# Patient Record
Sex: MALE | Race: BLACK OR AFRICAN AMERICAN | NOT HISPANIC OR LATINO | URBAN - METROPOLITAN AREA
[De-identification: names, ages, dates, MRNs, and addresses within clinical notes are randomized per-mention and may not be internally consistent; named-entity substitution may affect disease eponyms.]

---

## 2018-08-03 ENCOUNTER — INPATIENT (INPATIENT)
Facility: HOSPITAL | Age: 53
LOS: 1 days | Discharge: HOME | DRG: 872 | End: 2018-08-05
Attending: STUDENT IN AN ORGANIZED HEALTH CARE EDUCATION/TRAINING PROGRAM | Admitting: STUDENT IN AN ORGANIZED HEALTH CARE EDUCATION/TRAINING PROGRAM
Payer: COMMERCIAL

## 2018-08-03 VITALS
HEART RATE: 102 BPM | SYSTOLIC BLOOD PRESSURE: 104 MMHG | TEMPERATURE: 98 F | RESPIRATION RATE: 18 BRPM | OXYGEN SATURATION: 96 % | DIASTOLIC BLOOD PRESSURE: 72 MMHG

## 2018-08-03 LAB
ALBUMIN SERPL ELPH-MCNC: 4.2 G/DL — SIGNIFICANT CHANGE UP (ref 3.4–5)
ALP SERPL-CCNC: 76 U/L — SIGNIFICANT CHANGE UP (ref 40–120)
ALT FLD-CCNC: 30 U/L — SIGNIFICANT CHANGE UP (ref 12–42)
ANION GAP SERPL CALC-SCNC: 12 MMOL/L — SIGNIFICANT CHANGE UP (ref 9–16)
AST SERPL-CCNC: 62 U/L — HIGH (ref 15–37)
BASOPHILS NFR BLD AUTO: 0.7 % — SIGNIFICANT CHANGE UP (ref 0–2)
BILIRUB SERPL-MCNC: 2.2 MG/DL — HIGH (ref 0.2–1.2)
BUN SERPL-MCNC: 11 MG/DL — SIGNIFICANT CHANGE UP (ref 7–23)
CALCIUM SERPL-MCNC: 9 MG/DL — SIGNIFICANT CHANGE UP (ref 8.5–10.5)
CHLORIDE SERPL-SCNC: 104 MMOL/L — SIGNIFICANT CHANGE UP (ref 96–108)
CO2 SERPL-SCNC: 23 MMOL/L — SIGNIFICANT CHANGE UP (ref 22–31)
CREAT SERPL-MCNC: 0.98 MG/DL — SIGNIFICANT CHANGE UP (ref 0.5–1.3)
EOSINOPHIL NFR BLD AUTO: 0.1 % — SIGNIFICANT CHANGE UP (ref 0–6)
GLUCOSE SERPL-MCNC: 87 MG/DL — SIGNIFICANT CHANGE UP (ref 70–99)
HCT VFR BLD CALC: 40.3 % — SIGNIFICANT CHANGE UP (ref 39–50)
HGB BLD-MCNC: 13.5 G/DL — SIGNIFICANT CHANGE UP (ref 13–17)
IMM GRANULOCYTES NFR BLD AUTO: 0.3 % — SIGNIFICANT CHANGE UP (ref 0–1.5)
LACTATE SERPL-SCNC: 1.7 MMOL/L — SIGNIFICANT CHANGE UP (ref 0.4–2)
LACTATE SERPL-SCNC: 2.7 MMOL/L — HIGH (ref 0.4–2)
LYMPHOCYTES # BLD AUTO: 11 % — LOW (ref 13–44)
MCHC RBC-ENTMCNC: 30.5 PG — SIGNIFICANT CHANGE UP (ref 27–34)
MCHC RBC-ENTMCNC: 33.5 G/DL — SIGNIFICANT CHANGE UP (ref 32–36)
MCV RBC AUTO: 91.2 FL — SIGNIFICANT CHANGE UP (ref 80–100)
MONOCYTES NFR BLD AUTO: 10 % — SIGNIFICANT CHANGE UP (ref 2–14)
NEUTROPHILS NFR BLD AUTO: 77.9 % — HIGH (ref 43–77)
PLATELET # BLD AUTO: 218 K/UL — SIGNIFICANT CHANGE UP (ref 150–400)
POTASSIUM SERPL-MCNC: 5.1 MMOL/L — SIGNIFICANT CHANGE UP (ref 3.5–5.3)
POTASSIUM SERPL-SCNC: 5.1 MMOL/L — SIGNIFICANT CHANGE UP (ref 3.5–5.3)
PROT SERPL-MCNC: 8.8 G/DL — HIGH (ref 6.4–8.2)
RBC # BLD: 4.42 M/UL — SIGNIFICANT CHANGE UP (ref 4.2–5.8)
RBC # FLD: 15.5 % — SIGNIFICANT CHANGE UP (ref 10.3–16.9)
SODIUM SERPL-SCNC: 139 MMOL/L — SIGNIFICANT CHANGE UP (ref 132–145)
WBC # BLD: 11.9 K/UL — HIGH (ref 3.8–10.5)
WBC # FLD AUTO: 11.9 K/UL — HIGH (ref 3.8–10.5)

## 2018-08-03 PROCEDURE — 99284 EMERGENCY DEPT VISIT MOD MDM: CPT | Mod: 25

## 2018-08-03 PROCEDURE — 99223 1ST HOSP IP/OBS HIGH 75: CPT | Mod: GC

## 2018-08-03 PROCEDURE — 93010 ELECTROCARDIOGRAM REPORT: CPT

## 2018-08-03 PROCEDURE — 73660 X-RAY EXAM OF TOE(S): CPT | Mod: 26,RT

## 2018-08-03 RX ORDER — VANCOMYCIN HCL 1 G
VIAL (EA) INTRAVENOUS
Qty: 0 | Refills: 0 | Status: DISCONTINUED | OUTPATIENT
Start: 2018-08-03 | End: 2018-08-03

## 2018-08-03 RX ORDER — VANCOMYCIN HCL 1 G
1500 VIAL (EA) INTRAVENOUS ONCE
Qty: 0 | Refills: 0 | Status: COMPLETED | OUTPATIENT
Start: 2018-08-03 | End: 2018-08-03

## 2018-08-03 RX ORDER — KETOROLAC TROMETHAMINE 30 MG/ML
30 SYRINGE (ML) INJECTION ONCE
Qty: 0 | Refills: 0 | Status: DISCONTINUED | OUTPATIENT
Start: 2018-08-03 | End: 2018-08-03

## 2018-08-03 RX ORDER — DIPHENHYDRAMINE HCL 50 MG
50 CAPSULE ORAL ONCE
Qty: 0 | Refills: 0 | Status: COMPLETED | OUTPATIENT
Start: 2018-08-03 | End: 2018-08-03

## 2018-08-03 RX ORDER — SODIUM CHLORIDE 9 MG/ML
2000 INJECTION INTRAMUSCULAR; INTRAVENOUS; SUBCUTANEOUS ONCE
Qty: 0 | Refills: 0 | Status: COMPLETED | OUTPATIENT
Start: 2018-08-03 | End: 2018-08-03

## 2018-08-03 RX ADMIN — Medication 300 MILLIGRAM(S): at 17:45

## 2018-08-03 RX ADMIN — Medication 30 MILLIGRAM(S): at 18:44

## 2018-08-03 RX ADMIN — Medication 50 MILLIGRAM(S): at 20:14

## 2018-08-03 RX ADMIN — Medication 30 MILLIGRAM(S): at 17:45

## 2018-08-03 RX ADMIN — SODIUM CHLORIDE 1000 MILLILITER(S): 9 INJECTION INTRAMUSCULAR; INTRAVENOUS; SUBCUTANEOUS at 17:45

## 2018-08-03 RX ADMIN — SODIUM CHLORIDE 2000 MILLILITER(S): 9 INJECTION INTRAMUSCULAR; INTRAVENOUS; SUBCUTANEOUS at 18:44

## 2018-08-03 RX ADMIN — Medication 125 MILLIGRAM(S): at 20:14

## 2018-08-03 RX ADMIN — Medication 1500 MILLIGRAM(S): at 18:44

## 2018-08-03 NOTE — H&P ADULT - PROBLEM SELECTOR PLAN 3
Patient reports drinking 18-24 beers on the weekends. Denies history of withdrawal.  -watch for s/sx of withdrawal, CIWA currently 0 Elevated bilirubin to 2.2. May be 2/2 sepsis. NTTP in RUQ. Mildly elevated AST, may also be 2/2 Sepsis vs underlying liver pathology in setting of alcohol abuse.  -f/u CMP and direct bili in AM
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
## 2018-08-03 NOTE — H&P ADULT - NSHPLABSRESULTS_GEN_ALL_CORE
LABS:                        13.5   11.9  )-----------( 218      ( 03 Aug 2018 17:39 )             40.3     08-03    139  |  104  |  11  ----------------------------<  87  5.1   |  23  |  0.98    Ca    9.0      03 Aug 2018 17:39    TPro  8.8<H>  /  Alb  4.2  /  TBili  2.2<H>  /  DBili  x   /  AST  62<H>  /  ALT  30  /  AlkPhos  76  08-03    RADIOLOGY & ADDITIONAL TESTS:

## 2018-08-03 NOTE — H&P ADULT - PROBLEM SELECTOR PLAN 5
IMPROVE score of 0, no indication for pharmacological ppx 1) PCP Contacted on Admission: (Y/N) --> Name & Phone #:  2) Date of Contact with PCP:  3) PCP Contacted at Discharge: (Y/N)  4) Summary of Handoff Given to PCP:   5) Post-Discharge Appointment Date and Location:

## 2018-08-03 NOTE — ED PROVIDER NOTE - MEDICAL DECISION MAKING DETAILS
cellulitis to R great toe MTP + drainage/streaking/tendon tenderness. Will order sepsis workup w/ lactate and blood cultures. Will get xray and give IVF + Vanc. Will admit pt
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
## 2018-08-03 NOTE — ED ADULT TRIAGE NOTE - CHIEF COMPLAINT QUOTE
pt. reports being treated about 5 days ago for a wound to his right foot. Since then he has had pain, pt. noticed over the last 2-3 days he noticed more pain and swelling to the big toe. Purulent drainage from the wound noted

## 2018-08-03 NOTE — ED PROVIDER NOTE - ATTENDING CONTRIBUTION TO CARE
54 yo male with rapidly progressing R foot infection.  Centrally located on the 1st MTP without surrounding erythema, exquisite tenderness, drainage, mild lymphatic spread and pain with passive and active extension of the first digit.  Mild wbc and lactate elevation.  Covered with vancomycin, given 2L NS bolus.  Denies fever, no tachycardia or other vital sign derangements.  D/w Dr. Espino from podiatry and will admit to medicine service.

## 2018-08-03 NOTE — ED ADULT NURSE NOTE - NSIMPLEMENTINTERV_GEN_ALL_ED
Implemented All Fall Risk Interventions:  [place] to call system. Call bell, personal items and telephone within reach. Instruct patient to call for assistance. Room bathroom lighting operational. Non-slip footwear when patient is off stretcher. Physically safe environment: no spills, clutter or unnecessary equipment. Stretcher in lowest position, wheels locked, appropriate side rails in place. Provide visual cue, wrist band, yellow gown, etc. Monitor gait and stability. Monitor for mental status changes and reorient to person, place, and time. Review medications for side effects contributing to fall risk. Reinforce activity limits and safety measures with patient and family.

## 2018-08-03 NOTE — ED ADULT NURSE REASSESSMENT NOTE - NS ED NURSE REASSESS COMMENT FT1
pt resting in bed. no signs of acute distress noted. no sob or difficulty breathing noted. per day shift report, pt had recent reaction to vanco. pt c/o mild itching denies any swelling in throat. no swelling in tongue. no hives. no angioedema noted.
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
## 2018-08-03 NOTE — H&P ADULT - NSHPPHYSICALEXAM_GEN_ALL_CORE
Vital Signs Last 12 Hrs  T(F): 98.8 (08-03-18 @ 23:23), Max: 98.9 (08-03-18 @ 22:01)  HR: 76 (08-03-18 @ 23:23) (75 - 102)  BP: 124/71 (08-03-18 @ 23:23) (104/72 - 133/90)  BP(mean): --  RR: 18 (08-03-18 @ 23:23) (18 - 18)  SpO2: 97% (08-03-18 @ 23:23) (95% - 99%)      PHYSICAL EXAM:  Constitutional: NAD, comfortable in bed.  HEENT: NC/AT, PERRLA, EOMI, no conjunctival pallor or scleral icterus, MMM  Neck: Supple, no JVD  Respiratory: Normal rate, rhythm, depth, effort. CTAB. No w/r/r.   Cardiovascular: RRR, normal S1 and S2, no m/r/g.   Gastrointestinal: +BS, soft NTND, no guarding or rebound tenderness, no palpable masses   Extremities: wwp; no cyanosis or clubbing. RLE first digit with decreased extension and flexion 2/2 pain. No crepitus but TTP on plantar and extensor surfaces of foot.   Vascular: Pulses equal and strong throughout.   Neurological: AAOx3, no CN deficits, strength and sensation intact throughout.   Skin: No gross skin abnormalities or rashes Vital Signs Last 12 Hrs  T(F): 98.8 (08-03-18 @ 23:23), Max: 98.9 (08-03-18 @ 22:01)  HR: 76 (08-03-18 @ 23:23) (75 - 102)  BP: 124/71 (08-03-18 @ 23:23) (104/72 - 133/90)  BP(mean): --  RR: 18 (08-03-18 @ 23:23) (18 - 18)  SpO2: 97% (08-03-18 @ 23:23) (95% - 99%)      PHYSICAL EXAM:  Constitutional: NAD, comfortable in bed.  HEENT: NC/AT, PERRLA, EOMI, no conjunctival pallor or scleral icterus, MMM  Neck: Supple, no JVD  Respiratory: Normal rate, rhythm, depth, effort. CTAB. No w/r/r.   Cardiovascular: RRR, normal S1 and S2, no m/r/g.   Gastrointestinal: +BS, soft NTND, no guarding or rebound tenderness, no palpable masses   Extremities: wwp; no cyanosis or clubbing. RLE first digit with decreased extension and flexion 2/2 pain. No crepitus but TTP on plantar and extensor surfaces of foot.   Vascular: Pulses equal and strong throughout.   Neurological: AAOx3, no CN deficits, strength and sensation intact throughout.   Skin: No gross skin abnormalities or rashes  Psych: normal affect Vital Signs Last 12 Hrs  T(F): 98.8 (08-03-18 @ 23:23), Max: 98.9 (08-03-18 @ 22:01)  HR: 76 (08-03-18 @ 23:23) (75 - 102)  BP: 124/71 (08-03-18 @ 23:23) (104/72 - 133/90)  BP(mean): --  RR: 18 (08-03-18 @ 23:23) (18 - 18)  SpO2: 97% (08-03-18 @ 23:23) (95% - 99%)    PHYSICAL EXAM:  Constitutional: NAD, comfortable in bed.  HEENT: NC/AT, PERRLA, EOMI, no conjunctival pallor or scleral icterus, MMM  Neck: Supple, no JVD  Respiratory: Normal rate, rhythm, depth, effort. CTAB. No w/r/r.   Cardiovascular: RRR, normal S1 and S2, no m/r/g.   Gastrointestinal: +BS, soft NTND, no guarding or rebound tenderness, no palpable masses   Extremities: wwp; no cyanosis or clubbing. RLE first digit with decreased extension and flexion 2/2 pain. No crepitus but TTP on plantar and extensor surfaces of foot. 1cm laceration on dorsal aspect of MTP joint w/ purulent drainage, no surrounding erythema.  Vascular: Pulses equal and strong throughout.   Neurological: AAOx3, no CN deficits, strength and sensation intact throughout.   Skin: No gross skin abnormalities or rashes  Psych: normal affect

## 2018-08-03 NOTE — H&P ADULT - PROBLEM SELECTOR PLAN 2
as above Elevated bilirubin to 2.2. May be 2/2 sepsis. NTTP in RUQ. Mildly elevated AST, may also be 2/2 Sepsis vs underlying liver pathology in setting of alcohol abuse.  -f/u CMP and direct bili in AM

## 2018-08-03 NOTE — ED PROVIDER NOTE - PROGRESS NOTE DETAILS
left voicemail for Dr. Espino (podiatry) - awaiting callback so patient can be admitted signed out from NILESH Murphy pending admission, spoke with Dr. Lee who accepted patient for admission under Dr. Lagunas, patient agrees with plan

## 2018-08-03 NOTE — H&P ADULT - PROBLEM SELECTOR PLAN 1
Patient presenting with severe sepsis, SIRS w/ tachycardia and elevated WBC and elevated lactate that cleared with fluid. Likely 2/2 purulent cellulitis of RLE MTP joint. No crepitus on palpation, no signs of nec fasc.   -s/p Vancomycin and 2L (30cc/kg) in ED, plus diphenhydramine and solumedrol for diffuse itching following vancomycin administration  -will start oral Bactrim for 5 days  -f/u Bcx  -f/u official read of LE xray Patient presenting with severe sepsis, SIRS w/ tachycardia and elevated WBC and elevated lactate that cleared with fluid. Likely 2/2 purulent cellulitis of RLE MTP joint. No crepitus on palpation, no signs of nec fasc.   -s/p Vancomycin and 2L (30cc/kg) in ED, plus diphenhydramine and solumedrol for diffuse itching following vancomycin administration  -will continue vancomycin for now, consider changing to PO Abx following negative Bcx  -f/u Bcx  -f/u official read of LE xray Patient presenting with severe sepsis, SIRS w/ tachycardia and elevated WBC and elevated lactate that cleared with 2L NS. Per IDSA guidelines patient meeting Severe Purulent Cellulitis criteria of RLE MTP joint. No crepitus on palpation, limited ROM 2/2 Pain, good capillary refil.   -s/p Vancomycin and 2L (30cc/kg) in ED, plus diphenhydramine and solumedrol for diffuse itching following vancomycin administration  -will continue vancomycin 1250g Q12 for now and will adjust pending cultures  -given previous episode of mild diffuse pruritis following vancomycin administration, will premedicate each dose with diphenhydramine  -f/u Bcx  -f/u official read of LE xray Patient presenting with Cellulitis of RLE MTP joint. Patient does not meet SIRS criteria, has borderline WBC and mild tachycardia which is likely 2/2 pain. No crepitus on palpation, limited ROM 2/2 Pain, good capillary refil.   -s/p Vancomycin and 2L (30cc/kg) in ED, plus diphenhydramine and solumedrol for diffuse itching following vancomycin administration  -will start Bactrim IV 320mg BID d/t possible vancomycin allergy  -f/u Bcx  -Podiatry consult in AM  -f/u official read of LE xray, prelim read negative for gas gangrene or fracture but shows sinus tract that may extend to the bone raising concern for possible osteo  -consider MRI of foot w/ contrast

## 2018-08-03 NOTE — H&P ADULT - PROBLEM SELECTOR PLAN 4
1) PCP Contacted on Admission: (Y/N) --> Name & Phone #:  2) Date of Contact with PCP:  3) PCP Contacted at Discharge: (Y/N)  4) Summary of Handoff Given to PCP:   5) Post-Discharge Appointment Date and Location: Patient reports drinking 18-24 beers on the weekends. Denies history of withdrawal.  -watch for s/sx of withdrawal, CIWA currently 0

## 2018-08-03 NOTE — H&P ADULT - NSHPSOCIALHISTORY_GEN_ALL_CORE
Patient reports smoking 3cig a day for the past 28 years. He also reports drinking 18-24 beers every weekend. Denies recreational drug use.
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
## 2018-08-03 NOTE — PATIENT PROFILE ADULT. - NS TRANSFER PATIENT BELONGINGS
Cell Phone/PDA (specify)/Clothing
Scheduled for adenoidectomy nasal endoscopy, exam under anesthesia, myringotomies, possible tubes, auditory brain response on 4/4/17 with Toni Meyers MD at [facility].

## 2018-08-04 DIAGNOSIS — L03.031 CELLULITIS OF RIGHT TOE: ICD-10-CM

## 2018-08-04 DIAGNOSIS — R63.8 OTHER SYMPTOMS AND SIGNS CONCERNING FOOD AND FLUID INTAKE: ICD-10-CM

## 2018-08-04 DIAGNOSIS — Z91.89 OTHER SPECIFIED PERSONAL RISK FACTORS, NOT ELSEWHERE CLASSIFIED: ICD-10-CM

## 2018-08-04 DIAGNOSIS — A41.9 SEPSIS, UNSPECIFIED ORGANISM: ICD-10-CM

## 2018-08-04 DIAGNOSIS — Z29.9 ENCOUNTER FOR PROPHYLACTIC MEASURES, UNSPECIFIED: ICD-10-CM

## 2018-08-04 DIAGNOSIS — F10.10 ALCOHOL ABUSE, UNCOMPLICATED: ICD-10-CM

## 2018-08-04 DIAGNOSIS — E80.6 OTHER DISORDERS OF BILIRUBIN METABOLISM: ICD-10-CM

## 2018-08-04 LAB
ALBUMIN SERPL ELPH-MCNC: 3.6 G/DL — SIGNIFICANT CHANGE UP (ref 3.3–5)
ALP SERPL-CCNC: 58 U/L — SIGNIFICANT CHANGE UP (ref 40–120)
ALT FLD-CCNC: 14 U/L — SIGNIFICANT CHANGE UP (ref 10–45)
ANION GAP SERPL CALC-SCNC: 14 MMOL/L — SIGNIFICANT CHANGE UP (ref 5–17)
AST SERPL-CCNC: 22 U/L — SIGNIFICANT CHANGE UP (ref 10–40)
BILIRUB DIRECT SERPL-MCNC: 0.2 MG/DL — SIGNIFICANT CHANGE UP (ref 0–0.2)
BILIRUB SERPL-MCNC: 2.5 MG/DL — HIGH (ref 0.2–1.2)
BUN SERPL-MCNC: 16 MG/DL — SIGNIFICANT CHANGE UP (ref 7–23)
CALCIUM SERPL-MCNC: 8.6 MG/DL — SIGNIFICANT CHANGE UP (ref 8.4–10.5)
CHLORIDE SERPL-SCNC: 98 MMOL/L — SIGNIFICANT CHANGE UP (ref 96–108)
CO2 SERPL-SCNC: 24 MMOL/L — SIGNIFICANT CHANGE UP (ref 22–31)
CREAT SERPL-MCNC: 1.03 MG/DL — SIGNIFICANT CHANGE UP (ref 0.5–1.3)
GLUCOSE SERPL-MCNC: 233 MG/DL — HIGH (ref 70–99)
HCT VFR BLD CALC: 38.1 % — LOW (ref 39–50)
HGB BLD-MCNC: 11.8 G/DL — LOW (ref 13–17)
MAGNESIUM SERPL-MCNC: 1.7 MG/DL — SIGNIFICANT CHANGE UP (ref 1.6–2.6)
MCHC RBC-ENTMCNC: 29.3 PG — SIGNIFICANT CHANGE UP (ref 27–34)
MCHC RBC-ENTMCNC: 31 G/DL — LOW (ref 32–36)
MCV RBC AUTO: 94.5 FL — SIGNIFICANT CHANGE UP (ref 80–100)
PHOSPHATE SERPL-MCNC: 4.4 MG/DL — SIGNIFICANT CHANGE UP (ref 2.5–4.5)
PLATELET # BLD AUTO: 208 K/UL — SIGNIFICANT CHANGE UP (ref 150–400)
POTASSIUM SERPL-MCNC: 3.7 MMOL/L — SIGNIFICANT CHANGE UP (ref 3.5–5.3)
POTASSIUM SERPL-SCNC: 3.7 MMOL/L — SIGNIFICANT CHANGE UP (ref 3.5–5.3)
PROT SERPL-MCNC: 6.6 G/DL — SIGNIFICANT CHANGE UP (ref 6–8.3)
RBC # BLD: 4.03 M/UL — LOW (ref 4.2–5.8)
RBC # FLD: 15.2 % — SIGNIFICANT CHANGE UP (ref 10.3–16.9)
SODIUM SERPL-SCNC: 136 MMOL/L — SIGNIFICANT CHANGE UP (ref 135–145)
WBC # BLD: 6.4 K/UL — SIGNIFICANT CHANGE UP (ref 3.8–10.5)
WBC # FLD AUTO: 6.4 K/UL — SIGNIFICANT CHANGE UP (ref 3.8–10.5)

## 2018-08-04 PROCEDURE — 99232 SBSQ HOSP IP/OBS MODERATE 35: CPT

## 2018-08-04 RX ORDER — AZTREONAM 2 G
1 VIAL (EA) INJECTION
Qty: 10 | Refills: 0 | OUTPATIENT
Start: 2018-08-04 | End: 2018-08-08

## 2018-08-04 RX ORDER — POTASSIUM CHLORIDE 20 MEQ
40 PACKET (EA) ORAL ONCE
Qty: 0 | Refills: 0 | Status: COMPLETED | OUTPATIENT
Start: 2018-08-04 | End: 2018-08-04

## 2018-08-04 RX ORDER — VANCOMYCIN HCL 1 G
1250 VIAL (EA) INTRAVENOUS EVERY 12 HOURS
Qty: 0 | Refills: 0 | Status: DISCONTINUED | OUTPATIENT
Start: 2018-08-04 | End: 2018-08-04

## 2018-08-04 RX ORDER — DIPHENHYDRAMINE HCL 50 MG
50 CAPSULE ORAL EVERY 12 HOURS
Qty: 0 | Refills: 0 | Status: DISCONTINUED | OUTPATIENT
Start: 2018-08-04 | End: 2018-08-04

## 2018-08-04 RX ADMIN — Medication 520 MILLIGRAM(S): at 18:05

## 2018-08-04 RX ADMIN — Medication 520 MILLIGRAM(S): at 06:13

## 2018-08-04 RX ADMIN — Medication 40 MILLIEQUIVALENT(S): at 09:11

## 2018-08-04 NOTE — DISCHARGE NOTE ADULT - OTHER SIGNIFICANT FINDINGS
< from: Xray Toes, Right Foot (08.03.18 @ 18:44) >  Impression: No evidence of acute fracture no radiographic evidence of   osteomyelitis      < end of copied text >
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
## 2018-08-04 NOTE — PROGRESS NOTE ADULT - PROBLEM SELECTOR PLAN 4
F: None  E: replete PRN  N: Regular diet  DVT PPx: IMPROVE 0, none indicated  FULL CODE  Dispo: [facility]

## 2018-08-04 NOTE — DISCHARGE NOTE ADULT - CARE PLAN
Principal Discharge DX:	Cellulitis of toe of right foot Principal Discharge DX:	Cellulitis of toe of right foot  Goal:	Resolve infection  Assessment and plan of treatment:	You presented with cellulitis (soft tissue infection) of your right metatarsal joint. You were treated with antibiotics and were evaluated by podiatry for possible bone involvement, or osteomyelitis. However, no bony involvement was noted and xray was negative for osteomyelitis. Please contiinue taking Bactrim Double strength oral tablets twice a day for 7 days. Please follow up with your primary care provider for further management of care. If infection and associated symptoms worsens (pain, fever, purulence), please return to the emergency room.  Secondary Diagnosis:	Bilirubinemia  Assessment and plan of treatment:	You were found to have elevated liver function tests likely due to recent alcohol use and in setting of infection. Your levels returned to normal. We would recommend cutting down on alcohol use. Principal Discharge DX:	Cellulitis of toe of right foot  Goal:	Resolve infection  Assessment and plan of treatment:	You presented with cellulitis (soft tissue infection) of your right metatarsal joint. You were treated with antibiotics and were evaluated by podiatry for possible bone involvement, or osteomyelitis. However, no bony involvement was noted and xray was negative for osteomyelitis. Please continue taking Bactrim Double strength oral tablets twice a day for 7 days. Please follow up with your primary care provider for further management of care. If infection and associated symptoms worsens (pain, fever, purulence for wound), please return to the emergency room.  Secondary Diagnosis:	Bilirubinemia  Goal:	Improve liver function  Assessment and plan of treatment:	You were found to have elevated liver function tests likely due to recent alcohol use and in setting of infection. Your levels returned to normal. We would recommend cutting down on alcohol use.

## 2018-08-04 NOTE — PROGRESS NOTE ADULT - PROBLEM SELECTOR PLAN 1
Patient presenting with Cellulitis of RLE MTP joint. Patient does not meet SIRS criteria, has borderline WBC and mild tachycardia which is likely 2/2 pain. No crepitus on palpation, limited ROM 2/2 Pain, good capillary refill.   -s/p Vancomycin and 2L (30cc/kg) in ED, plus diphenhydramine and solumedrol for diffuse itching following vancomycin administration  -c/w Bactrim IV 320mg BID d/t possible vancomycin allergy  -f/u Bcx, NGTD  -f/u podiatry consult  -f/u official read of LE xray, prelim read negative for gas gangrene or fracture but shows sinus tract that may extend to the bone raising concern for possible osteo

## 2018-08-04 NOTE — PROGRESS NOTE ADULT - PROBLEM SELECTOR PLAN 3
Patient reports drinking 18-24 beers on the weekends. Denies history of withdrawal.  -watch for s/sx of withdrawal, CIWA currently 0
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
## 2018-08-04 NOTE — PROGRESS NOTE ADULT - ATTENDING COMMENTS
Pt seen and examined at bedside. History reviewed, labs and relevant imaging reviewed.    No pain, ambulating well    Exam:  Laceration to R 1st MTP joint- clean, dry, no purulence noted.     Plan  - Clinically, no evidence of underlying bone infection, likely superficial cellulitis. He is ambulating well with minimal pain. Treat w/ bactrim DS on tab bid x 5 days. F/u with PMD in 1 week. Pt seen and examined at bedside. History reviewed, labs and relevant imaging reviewed. Agree with assessment and plan above.    No pain, ambulating well    Exam:  Laceration to R 1st MTP joint- clean, dry, no purulence noted.     Plan  - Clinically, no evidence of underlying bone infection, likely superficial cellulitis. He is ambulating well with minimal pain. Treat w/ bactrim DS on tab bid x 5 days. F/u with PMD in 1 week.  - Elevated bilirubin- mostly direct- possibly related to alcohol abuse, outpt w/u with ultrasound, GI etc. Reinforced with patient to reduce EtOH intake. Pt seen and examined at bedside. History reviewed, labs and relevant imaging reviewed. Agree with assessment and plan above.    No pain, ambulating well    Exam:  Laceration to R 1st MTP joint- clean, dry, no purulence noted.     Plan  - Clinically, no evidence of underlying bone infection, likely superficial cellulitis. He is ambulating well with minimal pain. Treat w/ bactrim DS on tab bid x 5 days. F/u with PMD in 1 week. Outpt podiatary follow up.  - Elevated bilirubin- mostly direct- possibly related to alcohol abuse, outpt w/u with ultrasound, GI etc. Reinforced with patient to reduce EtOH intake. Pt seen and examined at bedside. History reviewed, labs and relevant imaging reviewed. Agree with assessment and plan above.    No pain, ambulating well    Exam:  Laceration to R 1st MTP joint- clean, dry, no purulence noted.     Plan  - Clinically, no evidence of underlying bone infection, likely superficial cellulitis. He is ambulating well with minimal pain. Podiatary to see patient in house- will f/u recs. f/u R foot x ray  - Elevated bilirubin- mostly direct- possibly related to alcohol abuse, outpt w/u with ultrasound, GI etc. Reinforced with patient to reduce EtOH intake.

## 2018-08-04 NOTE — DISCHARGE NOTE ADULT - PATIENT PORTAL LINK FT
You can access the [facility] Patient Portal, offered by [facility], by registering with the following website: [email or web address]

## 2018-08-04 NOTE — PROGRESS NOTE ADULT - MINUTES
Pt calling back stating that Dr. Nasreen Somers office would like pt test results to be fax to them.  Please fax to [phone]
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
## 2018-08-04 NOTE — DISCHARGE NOTE ADULT - CARE PROVIDER_API CALL
Dane Armstrong,,   [facility] [place], [place] [id number] [phone]  Phone: (   )    -  Fax: (   )    -

## 2018-08-04 NOTE — DISCHARGE NOTE ADULT - MEDICATION SUMMARY - MEDICATIONS TO TAKE
I will START or STAY ON the medications listed below when I get home from the hospital:    Bactrim  mg-160 mg oral tablet  -- 1 tab(s) by mouth 2 times a day  -- Indication: For Cellulitis of toe of right foot

## 2018-08-04 NOTE — PROGRESS NOTE ADULT - SUBJECTIVE AND OBJECTIVE BOX
SUBJECTIVE / INTERVAL HPI: ARMANDO overnight. Patient seen and examined at bedside. Reports feeling no additional pain in his foot, even when standing on it. Denies subjective fever/chills, diaphoresis, headaches, N/V/D, abd pain.    VITAL SIGNS:  Vital Signs Last 24 Hrs  T(C): 36.9 (04 Aug 2018 08:58), Max: 37.2 (03 Aug 2018 22:01)  T(F): 98.4 (04 Aug 2018 08:58), Max: 98.9 (03 Aug 2018 22:01)  HR: 84 (04 Aug 2018 08:58) (75 - 102)  BP: 116/72 (04 Aug 2018 08:58) (104/72 - 144/87)  RR: 17 (04 Aug 2018 08:58) (17 - 18)  SpO2: 96% (04 Aug 2018 08:58) (95% - 99%)    PHYSICAL EXAM:  Constitutional: NAD, comfortable in bed.  HEENT: NC/AT, EOMI, no conjunctival pallor or scleral icterus, MMM  Respiratory: CTABL, no w/r/r  Cardiovascular: RRR, no m/r/g appreciated   Gastrointestinal: +BS, soft NTND, no guarding or rebound tenderness, no palpable masses  Extremities: wwp; no cyanosis or clubbing. Laceration to R 1st MTP joint with dressing in place, c/d/i, no surrounding erythema or edema.  Vascular: 2+ peripheral pulses in all 4 extremities  Neurological: AAOx3, no CN deficits, strength and sensation intact throughout  Skin: No gross skin abnormalities or rashes    MEDICATIONS:  trimethoprim / sulfamethoxazole IVPB 320 milliGRAM(s) IV Intermittent every 12 hours    ALLERGIES:  vancomycin (Hives; Urticaria)    LABS:               11.8   6.4   )-----------( 208      ( 04 Aug 2018 07:12 )             38.1     08-04    136  |  98  |  16  ----------------------------<  233<H>  3.7   |  24  |  1.03    Ca    8.6      04 Aug 2018 07:12  Phos  4.4     08-04  Mg     1.7     08-04    TPro  6.6  /  Alb  3.6  /  TBili  2.5<H>  /  DBili  0.2  /  AST  22  /  ALT  14  /  AlkPhos  58  08-04    RADIOLOGY & ADDITIONAL TESTS: Reviewed.

## 2018-08-04 NOTE — CONSULT NOTE ADULT - ASSESSMENT
A/P:  53yMale presents with cellulitis of the Right Great toe with resolving cellulitis    -Recommend continuing IV antibiotics   -f/u on final Xray read  -Applied betadine and DSD  -Podiatry will follow while pt is in-house

## 2018-08-04 NOTE — DISCHARGE NOTE ADULT - PROVIDER TOKENS
FREE:[LAST:[Dane Armstrong,],PHONE:[(   )    -],FAX:[(   )    -],ADDRESS:[[address] [place] [address]  [phone]]]

## 2018-08-04 NOTE — DISCHARGE NOTE ADULT - PLAN OF CARE
Resolve infection You presented with cellulitis (soft tissue infection) of your right metatarsal joint. You were treated with antibiotics and were evaluated by podiatry for possible bone involvement, or osteomyelitis. However, no bony involvement was noted and xray was negative for osteomyelitis. Please contiinue taking Bactrim Double strength oral tablets twice a day for 7 days. Please follow up with your primary care provider for further management of care. If infection and associated symptoms worsens (pain, fever, purulence), please return to the emergency room. You were found to have elevated liver function tests likely due to recent alcohol use and in setting of infection. Your levels returned to normal. We would recommend cutting down on alcohol use. You presented with cellulitis (soft tissue infection) of your right metatarsal joint. You were treated with antibiotics and were evaluated by podiatry for possible bone involvement, or osteomyelitis. However, no bony involvement was noted and xray was negative for osteomyelitis. Please continue taking Bactrim Double strength oral tablets twice a day for 7 days. Please follow up with your primary care provider for further management of care. If infection and associated symptoms worsens (pain, fever, purulence for wound), please return to the emergency room. Improve liver function

## 2018-08-04 NOTE — PROGRESS NOTE ADULT - PROBLEM SELECTOR PLAN 2
Elevated bilirubin to 2.5, was 2.2 on admission. May be 2/2 sepsis. NTTP in RUQ. Mildly elevated AST, may also be 2/2 Sepsis vs underlying liver pathology in setting of alcohol abuse.

## 2018-08-05 VITALS
HEART RATE: 59 BPM | TEMPERATURE: 98 F | SYSTOLIC BLOOD PRESSURE: 116 MMHG | OXYGEN SATURATION: 99 % | RESPIRATION RATE: 16 BRPM | DIASTOLIC BLOOD PRESSURE: 72 MMHG

## 2018-08-05 PROCEDURE — 93005 ELECTROCARDIOGRAM TRACING: CPT

## 2018-08-05 PROCEDURE — 99285 EMERGENCY DEPT VISIT HI MDM: CPT | Mod: 25

## 2018-08-05 PROCEDURE — 80053 COMPREHEN METABOLIC PANEL: CPT

## 2018-08-05 PROCEDURE — 99238 HOSP IP/OBS DSCHRG MGMT 30/<: CPT

## 2018-08-05 PROCEDURE — 96375 TX/PRO/DX INJ NEW DRUG ADDON: CPT

## 2018-08-05 PROCEDURE — 96365 THER/PROPH/DIAG IV INF INIT: CPT

## 2018-08-05 PROCEDURE — 87040 BLOOD CULTURE FOR BACTERIA: CPT

## 2018-08-05 PROCEDURE — 73660 X-RAY EXAM OF TOE(S): CPT

## 2018-08-05 PROCEDURE — 82248 BILIRUBIN DIRECT: CPT

## 2018-08-05 PROCEDURE — 36415 COLL VENOUS BLD VENIPUNCTURE: CPT

## 2018-08-05 PROCEDURE — 84100 ASSAY OF PHOSPHORUS: CPT

## 2018-08-05 PROCEDURE — 85027 COMPLETE CBC AUTOMATED: CPT

## 2018-08-05 PROCEDURE — 83735 ASSAY OF MAGNESIUM: CPT

## 2018-08-05 RX ORDER — POTASSIUM CHLORIDE 20 MEQ
40 PACKET (EA) ORAL ONCE
Qty: 0 | Refills: 0 | Status: COMPLETED | OUTPATIENT
Start: 2018-08-05 | End: 2018-08-05

## 2018-08-05 RX ORDER — MAGNESIUM SULFATE 500 MG/ML
1 VIAL (ML) INJECTION ONCE
Qty: 0 | Refills: 0 | Status: COMPLETED | OUTPATIENT
Start: 2018-08-05 | End: 2018-08-05

## 2018-08-05 RX ADMIN — Medication 40 MILLIEQUIVALENT(S): at 09:34

## 2018-08-05 RX ADMIN — Medication 100 GRAM(S): at 09:34

## 2018-08-05 RX ADMIN — Medication 520 MILLIGRAM(S): at 05:52
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
## 2018-08-05 NOTE — PROGRESS NOTE ADULT - SUBJECTIVE AND OBJECTIVE BOX
PROGRESS NOTE   Patient is a 53y old  Male who presents with a chief complaint of Cellulitis (04 Aug 2018 16:00)      Interval History: No acute events overnight. Denies pain in the foot.  Denies fever, chills, nausea, emesis, chest pain, dyspnea, abdominal pain.     Vital Signs Last 24 Hrs  T(C): 36.9 (05 Aug 2018 08:26), Max: 37 (04 Aug 2018 21:34)  T(F): 98.5 (05 Aug 2018 08:26), Max: 98.6 (04 Aug 2018 21:34)  HR: 73 (05 Aug 2018 08:26) (69 - 75)  BP: 116/74 (05 Aug 2018 08:26) (116/74 - 135/91)  BP(mean): --  RR: 17 (05 Aug 2018 08:26) (17 - 18)  SpO2: 98% (05 Aug 2018 08:26) (97% - 99%)                          11.8   6.4   )-----------( 208      ( 04 Aug 2018 07:12 )             38.1               08-04    136  |  98  |  16  ----------------------------<  233<H>  3.7   |  24  |  1.03    Ca    8.6      04 Aug 2018 07:12  Phos  4.4     08-04  Mg     1.7     08-04    TPro  6.6  /  Alb  3.6  /  TBili  2.5<H>  /  DBili  0.2  /  AST  22  /  ALT  14  /  AlkPhos  58  08-04      < from: Xray Toes, Right Foot (08.03.18 @ 18:44) >  Impression: No evidence of acute fracture no radiographic evidence of   osteomyelitis    < end of copied text >    PHYSICAL EXAM  GEN: BETH WALLER is a pleasant well-nourished, well developed 53y Male in no acute distress, alert awake, and oriented to person, place and time.     RLE focused:   Vasc: DP 2/4. PT 1/4. CFT < 3 sec x 5. TG wnl.   Derm: superficial laceration on the medial aspect of the right 1st MTPJ. -PTB. no fluctuance. no crepitus. no purulence. no malodor. no clinical signs of infection.   Neruo: Protective Sensation intact.

## 2018-08-05 NOTE — PROGRESS NOTE ADULT - ASSESSMENT
A/P:  53yMale presents with cellulitis of the Right Great toe with resolving cellulitis    -Recommend continuing antibiotics   -X ray shows no signs of osteomyelitis   -Applied betadine and DSD  -Podiatry will follow while pt is in-house

## 2018-08-05 NOTE — PROGRESS NOTE ADULT - PROBLEM SELECTOR PLAN 4
F: None  E: replete PRN  N: Regular diet  DVT PPx: IMPROVE 0, none indicated  FULL CODE  Dispo: [facility]
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
## 2018-08-05 NOTE — PROGRESS NOTE ADULT - PROBLEM SELECTOR PLAN 1
cellulitis of R 1st MTPJ on IV bactrim- clinically improved w/ improved ambulation and able to bear weight, no fevers no leukocytosis. podiatary following, xray w/ no evidence of osteomyelitis, blood cultures negative. pt is clinically improved. outpt f/u with PMD  - discharge on Bactrim DS 1 tab PO bid x 7 days. F/u w/ PMD in 1 week.

## 2018-08-09 LAB
CULTURE RESULTS: SIGNIFICANT CHANGE UP
CULTURE RESULTS: SIGNIFICANT CHANGE UP
SPECIMEN SOURCE: SIGNIFICANT CHANGE UP
SPECIMEN SOURCE: SIGNIFICANT CHANGE UP

## 2018-08-10 DIAGNOSIS — R01.1 CARDIAC MURMUR, UNSPECIFIED: ICD-10-CM

## 2018-08-10 DIAGNOSIS — F10.10 ALCOHOL ABUSE, UNCOMPLICATED: ICD-10-CM

## 2018-08-10 DIAGNOSIS — A41.9 SEPSIS, UNSPECIFIED ORGANISM: ICD-10-CM

## 2018-08-10 DIAGNOSIS — R65.20 SEVERE SEPSIS WITHOUT SEPTIC SHOCK: ICD-10-CM

## 2018-08-10 DIAGNOSIS — M79.671 PAIN IN RIGHT FOOT: ICD-10-CM

## 2018-08-10 DIAGNOSIS — L03.031 CELLULITIS OF RIGHT TOE: ICD-10-CM

## 2018-08-10 DIAGNOSIS — E80.7 DISORDER OF BILIRUBIN METABOLISM, UNSPECIFIED: ICD-10-CM

## 2018-09-14 ENCOUNTER — EMERGENCY (EMERGENCY)
Facility: HOSPITAL | Age: 53
LOS: 1 days | Discharge: HOME | End: 2018-09-14
Attending: EMERGENCY MEDICINE | Admitting: EMERGENCY MEDICINE
Payer: MEDICAID

## 2018-09-14 VITALS
OXYGEN SATURATION: 96 % | SYSTOLIC BLOOD PRESSURE: 135 MMHG | RESPIRATION RATE: 16 BRPM | DIASTOLIC BLOOD PRESSURE: 87 MMHG | HEART RATE: 82 BPM | TEMPERATURE: 99 F | WEIGHT: 145.06 LBS

## 2018-09-14 DIAGNOSIS — R42 DIZZINESS AND GIDDINESS: ICD-10-CM

## 2018-09-14 DIAGNOSIS — Z88.1 ALLERGY STATUS TO OTHER ANTIBIOTIC AGENTS STATUS: ICD-10-CM

## 2018-09-14 DIAGNOSIS — Z79.899 OTHER LONG TERM (CURRENT) DRUG THERAPY: ICD-10-CM

## 2018-09-14 DIAGNOSIS — Z79.2 LONG TERM (CURRENT) USE OF ANTIBIOTICS: ICD-10-CM

## 2018-09-14 DIAGNOSIS — F41.9 ANXIETY DISORDER, UNSPECIFIED: ICD-10-CM

## 2018-09-14 DIAGNOSIS — F17.200 NICOTINE DEPENDENCE, UNSPECIFIED, UNCOMPLICATED: ICD-10-CM

## 2018-09-14 PROCEDURE — 93010 ELECTROCARDIOGRAM REPORT: CPT

## 2018-09-14 PROCEDURE — 99220: CPT

## 2018-09-14 RX ORDER — DIAZEPAM 5 MG
5 TABLET ORAL ONCE
Qty: 0 | Refills: 0 | Status: DISCONTINUED | OUTPATIENT
Start: 2018-09-14 | End: 2018-09-14

## 2018-09-14 RX ORDER — MECLIZINE HCL 12.5 MG
25 TABLET ORAL ONCE
Qty: 0 | Refills: 0 | Status: COMPLETED | OUTPATIENT
Start: 2018-09-14 | End: 2018-09-14

## 2018-09-14 RX ORDER — MECLIZINE HCL 12.5 MG
1 TABLET ORAL
Qty: 60 | Refills: 1 | OUTPATIENT
Start: 2018-09-14

## 2018-09-14 RX ADMIN — Medication 25 MILLIGRAM(S): at 21:54

## 2018-09-14 NOTE — ED ADULT TRIAGE NOTE - CHIEF COMPLAINT QUOTE
Pt with complaint feeling as it though the room is spinning. Reports history of vertigo and ran out of meclizine. States he fell in January and hit his head and has been having similar episodes since then.

## 2018-09-14 NOTE — ED PROVIDER NOTE - PMH
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
## 2018-09-14 NOTE — ED PROVIDER NOTE - NEUROLOGICAL, MLM
Alert and oriented, no focal deficits, no motor or sensory deficits. Alert and oriented, no focal deficits, no motor or sensory deficits. FTN and LINO wnl, vertigo with head mvt/ sitting up.

## 2018-09-14 NOTE — ED ADULT NURSE NOTE - OBJECTIVE STATEMENT
52 yo M c.o dizziness x 1 day. Pt states "I was on my way home and became really dizzy and it was difficult to see, my heart started beating fast and I had some chest discomfort for about 3 min". Pt rates cp at a 4:10 and states it lasted 3 minutes. Pt denies cp or sob at this Time. Pt states he has a history of Vertigo and takes Meclazine but ran out of his medications. Pt has no s.s of acute distress noted at this time or trauma to body and denies fever, chills, nausea, vomiting, headache, blurred vision at this time.
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
## 2018-09-14 NOTE — ED PROVIDER NOTE - EYES, MLM
Clear bilaterally, pupils equal, round 3mm Clear bilaterally, pupils equal, round 3mm, horizontal nystagmus with R gaze

## 2018-09-14 NOTE — ED ADULT NURSE NOTE - NSIMPLEMENTINTERV_GEN_ALL_ED
Implemented All Fall with Harm Risk Interventions:  [place] to call system. Call bell, personal items and telephone within reach. Instruct patient to call for assistance. Room bathroom lighting operational. Non-slip footwear when patient is off stretcher. Physically safe environment: no spills, clutter or unnecessary equipment. Stretcher in lowest position, wheels locked, appropriate side rails in place. Provide visual cue, wrist band, yellow gown, etc. Monitor gait and stability. Monitor for mental status changes and reorient to person, place, and time. Review medications for side effects contributing to fall risk. Reinforce activity limits and safety measures with patient and family. Provide visual clues: red socks.

## 2018-09-14 NOTE — ED CDU PROVIDER INITIAL DAY NOTE - DETAILS
Patient pleated on obs for recurrent vertigo , no controlled with PO meclizine and Epley. Getting trial ov valium/sleep/rest.

## 2018-09-14 NOTE — ED PROVIDER NOTE - FAMILY HISTORY
Mother  Still living? Unknown  Family history of COPD (chronic obstructive pulmonary disease), Age at diagnosis: Age Unknown
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
## 2018-09-15 VITALS
RESPIRATION RATE: 17 BRPM | DIASTOLIC BLOOD PRESSURE: 78 MMHG | HEART RATE: 64 BPM | OXYGEN SATURATION: 96 % | SYSTOLIC BLOOD PRESSURE: 142 MMHG

## 2018-09-15 PROCEDURE — 99217: CPT

## 2018-09-15 NOTE — ED ADULT NURSE REASSESSMENT NOTE - NS ED NURSE REASSESS COMMENT FT1
Food ordered for patient at this time. Pt denies cp, sob, nausea or dizziness at this time. Pt stable and will continue to monitor.

## 2019-12-24 NOTE — ED PROVIDER NOTE - LATERALITY
Thyroid still low needs to increase dose to 200mcg same time each day on empty stomach. Please schedule repeat tsh with office visit after in six weeks.    right

## 2020-03-02 NOTE — H&P ADULT - PROBLEM SELECTOR PROBLEM 6
March 2, 2020        [address],  [address]        Welcome to [facility].  There is no extra cost to you. I would like to partner with you to help lower your health risks and reach your goals for healthy living. I can help you to follow your doctorâs plan of care. I can offer support and arrange services to help you do the things you want and help you stay as healthy as possible. [facility] Care Management Program Benefits:     Â· NO COST to you   Â· Convenient    Â· Specially trained RN   Â· Make your own health care plan   Â· Meds review with a Pharmacist as needed   Â· Link to health care, social and community resources     I will call to tell you more about the program, answer any questions you have and talk with you about your needs. After the first phone call, we will talk about your health and wellness goals. If you have any questions or if I can help you in any way, please call me at     [phone]  8:00 AM to 4:00 PM CST (Monday-Friday)    If I am busy, you can leave a message on my private voicemail and Omar return your call. I want to help you stay healthy and hope to work with you soon!      Sincerely,   Ismael Luo, [address] Nutrition, metabolism, and development symptoms Need for prophylactic measure

## 2021-06-27 ENCOUNTER — EMERGENCY (EMERGENCY)
Facility: HOSPITAL | Age: 56
LOS: 1 days | Discharge: HOME | End: 2021-06-27
Attending: EMERGENCY MEDICINE | Admitting: EMERGENCY MEDICINE
Payer: COMMERCIAL

## 2021-06-27 VITALS
RESPIRATION RATE: 18 BRPM | OXYGEN SATURATION: 99 % | HEART RATE: 78 BPM | DIASTOLIC BLOOD PRESSURE: 82 MMHG | SYSTOLIC BLOOD PRESSURE: 133 MMHG | TEMPERATURE: 98 F

## 2021-06-27 VITALS
OXYGEN SATURATION: 97 % | RESPIRATION RATE: 18 BRPM | WEIGHT: 139.99 LBS | TEMPERATURE: 99 F | HEIGHT: 69 IN | SYSTOLIC BLOOD PRESSURE: 96 MMHG | DIASTOLIC BLOOD PRESSURE: 61 MMHG | HEART RATE: 80 BPM

## 2021-06-27 DIAGNOSIS — R42 DIZZINESS AND GIDDINESS: ICD-10-CM

## 2021-06-27 DIAGNOSIS — I44.0 ATRIOVENTRICULAR BLOCK, FIRST DEGREE: ICD-10-CM
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
## 2021-06-27 DIAGNOSIS — F41.9 ANXIETY DISORDER, UNSPECIFIED: ICD-10-CM

## 2021-06-27 DIAGNOSIS — R55 SYNCOPE AND COLLAPSE: ICD-10-CM

## 2021-06-27 DIAGNOSIS — Z88.1 ALLERGY STATUS TO OTHER ANTIBIOTIC AGENTS STATUS: ICD-10-CM
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
## 2021-06-27 DIAGNOSIS — F10.10 ALCOHOL ABUSE, UNCOMPLICATED: ICD-10-CM

## 2021-06-27 LAB
ALBUMIN SERPL ELPH-MCNC: 4.1 G/DL — SIGNIFICANT CHANGE UP (ref 3.3–5)
ALBUMIN SERPL ELPH-MCNC: 4.5 G/DL — SIGNIFICANT CHANGE UP (ref 3.3–5)
ALP SERPL-CCNC: 62 U/L — SIGNIFICANT CHANGE UP (ref 40–120)
ALP SERPL-CCNC: 71 U/L — SIGNIFICANT CHANGE UP (ref 40–120)
ALT FLD-CCNC: 14 U/L — SIGNIFICANT CHANGE UP (ref 10–45)
ALT FLD-CCNC: SIGNIFICANT CHANGE UP (ref 10–45)
ANION GAP SERPL CALC-SCNC: 11 MMOL/L — SIGNIFICANT CHANGE UP (ref 5–17)
ANION GAP SERPL CALC-SCNC: 13 MMOL/L — SIGNIFICANT CHANGE UP (ref 5–17)
AST SERPL-CCNC: 36 U/L — SIGNIFICANT CHANGE UP (ref 10–40)
AST SERPL-CCNC: SIGNIFICANT CHANGE UP (ref 10–40)
BASOPHILS # BLD AUTO: 0.05 K/UL — SIGNIFICANT CHANGE UP (ref 0–0.2)
BASOPHILS NFR BLD AUTO: 1.1 % — SIGNIFICANT CHANGE UP (ref 0–2)
BILIRUB SERPL-MCNC: 0.6 MG/DL — SIGNIFICANT CHANGE UP (ref 0.2–1.2)
BILIRUB SERPL-MCNC: 0.8 MG/DL — SIGNIFICANT CHANGE UP (ref 0.2–1.2)
BUN SERPL-MCNC: 10 MG/DL — SIGNIFICANT CHANGE UP (ref 7–23)
BUN SERPL-MCNC: 11 MG/DL — SIGNIFICANT CHANGE UP (ref 7–23)
CALCIUM SERPL-MCNC: 8.3 MG/DL — LOW (ref 8.4–10.5)
CALCIUM SERPL-MCNC: 8.4 MG/DL — SIGNIFICANT CHANGE UP (ref 8.4–10.5)
CHLORIDE SERPL-SCNC: 105 MMOL/L — SIGNIFICANT CHANGE UP (ref 96–108)
CHLORIDE SERPL-SCNC: 107 MMOL/L — SIGNIFICANT CHANGE UP (ref 96–108)
CO2 SERPL-SCNC: 22 MMOL/L — SIGNIFICANT CHANGE UP (ref 22–31)
CO2 SERPL-SCNC: 24 MMOL/L — SIGNIFICANT CHANGE UP (ref 22–31)
CREAT SERPL-MCNC: 0.96 MG/DL — SIGNIFICANT CHANGE UP (ref 0.5–1.3)
CREAT SERPL-MCNC: 1 MG/DL — SIGNIFICANT CHANGE UP (ref 0.5–1.3)
EOSINOPHIL # BLD AUTO: 0.06 K/UL — SIGNIFICANT CHANGE UP (ref 0–0.5)
EOSINOPHIL NFR BLD AUTO: 1.4 % — SIGNIFICANT CHANGE UP (ref 0–6)
ETHANOL SERPL-MCNC: 238 MG/DL — HIGH (ref 0–10)
GLUCOSE SERPL-MCNC: 71 MG/DL — SIGNIFICANT CHANGE UP (ref 70–99)
GLUCOSE SERPL-MCNC: 77 MG/DL — SIGNIFICANT CHANGE UP (ref 70–99)
HCT VFR BLD CALC: 40.1 % — SIGNIFICANT CHANGE UP (ref 39–50)
HGB BLD-MCNC: 13 G/DL — SIGNIFICANT CHANGE UP (ref 13–17)
IMM GRANULOCYTES NFR BLD AUTO: 0.2 % — SIGNIFICANT CHANGE UP (ref 0–1.5)
LYMPHOCYTES # BLD AUTO: 1.6 K/UL — SIGNIFICANT CHANGE UP (ref 1–3.3)
LYMPHOCYTES # BLD AUTO: 36.2 % — SIGNIFICANT CHANGE UP (ref 13–44)
MAGNESIUM SERPL-MCNC: 2.2 MG/DL — SIGNIFICANT CHANGE UP (ref 1.6–2.6)
MCHC RBC-ENTMCNC: 30.1 PG — SIGNIFICANT CHANGE UP (ref 27–34)
MCHC RBC-ENTMCNC: 32.4 GM/DL — SIGNIFICANT CHANGE UP (ref 32–36)
MCV RBC AUTO: 92.8 FL — SIGNIFICANT CHANGE UP (ref 80–100)
MONOCYTES # BLD AUTO: 0.42 K/UL — SIGNIFICANT CHANGE UP (ref 0–0.9)
MONOCYTES NFR BLD AUTO: 9.5 % — SIGNIFICANT CHANGE UP (ref 2–14)
NEUTROPHILS # BLD AUTO: 2.28 K/UL — SIGNIFICANT CHANGE UP (ref 1.8–7.4)
NEUTROPHILS NFR BLD AUTO: 51.6 % — SIGNIFICANT CHANGE UP (ref 43–77)
NRBC # BLD: 0 /100 WBCS — SIGNIFICANT CHANGE UP (ref 0–0)
PLATELET # BLD AUTO: 286 K/UL — SIGNIFICANT CHANGE UP (ref 150–400)
POTASSIUM SERPL-MCNC: 4.1 MMOL/L — SIGNIFICANT CHANGE UP (ref 3.5–5.3)
POTASSIUM SERPL-MCNC: SIGNIFICANT CHANGE UP (ref 3.5–5.3)
POTASSIUM SERPL-SCNC: 4.1 MMOL/L — SIGNIFICANT CHANGE UP (ref 3.5–5.3)
POTASSIUM SERPL-SCNC: SIGNIFICANT CHANGE UP (ref 3.5–5.3)
PROT SERPL-MCNC: 7.4 G/DL — SIGNIFICANT CHANGE UP (ref 6–8.3)
PROT SERPL-MCNC: 7.8 G/DL — SIGNIFICANT CHANGE UP (ref 6–8.3)
RBC # BLD: 4.32 M/UL — SIGNIFICANT CHANGE UP (ref 4.2–5.8)
RBC # FLD: 15.5 % — HIGH (ref 10.3–14.5)
SODIUM SERPL-SCNC: 140 MMOL/L — SIGNIFICANT CHANGE UP (ref 135–145)
SODIUM SERPL-SCNC: 142 MMOL/L — SIGNIFICANT CHANGE UP (ref 135–145)
TROPONIN T SERPL-MCNC: 0.01 NG/ML — SIGNIFICANT CHANGE UP (ref 0–0.01)
WBC # BLD: 4.42 K/UL — SIGNIFICANT CHANGE UP (ref 3.8–10.5)
WBC # FLD AUTO: 4.42 K/UL — SIGNIFICANT CHANGE UP (ref 3.8–10.5)

## 2021-06-27 PROCEDURE — 93005 ELECTROCARDIOGRAM TRACING: CPT

## 2021-06-27 PROCEDURE — 99284 EMERGENCY DEPT VISIT MOD MDM: CPT | Mod: 25

## 2021-06-27 PROCEDURE — 84484 ASSAY OF TROPONIN QUANT: CPT

## 2021-06-27 PROCEDURE — 70450 CT HEAD/BRAIN W/O DYE: CPT

## 2021-06-27 PROCEDURE — 70450 CT HEAD/BRAIN W/O DYE: CPT | Mod: 26,MA

## 2021-06-27 PROCEDURE — 93010 ELECTROCARDIOGRAM REPORT: CPT

## 2021-06-27 PROCEDURE — 85025 COMPLETE CBC W/AUTO DIFF WBC: CPT

## 2021-06-27 PROCEDURE — 71045 X-RAY EXAM CHEST 1 VIEW: CPT

## 2021-06-27 PROCEDURE — 83735 ASSAY OF MAGNESIUM: CPT

## 2021-06-27 PROCEDURE — 99284 EMERGENCY DEPT VISIT MOD MDM: CPT

## 2021-06-27 PROCEDURE — 36415 COLL VENOUS BLD VENIPUNCTURE: CPT

## 2021-06-27 PROCEDURE — 80053 COMPREHEN METABOLIC PANEL: CPT

## 2021-06-27 PROCEDURE — 80307 DRUG TEST PRSMV CHEM ANLYZR: CPT

## 2021-06-27 PROCEDURE — 82962 GLUCOSE BLOOD TEST: CPT

## 2021-06-27 PROCEDURE — 71045 X-RAY EXAM CHEST 1 VIEW: CPT | Mod: 26

## 2021-06-27 RX ORDER — SODIUM CHLORIDE 9 MG/ML
1000 INJECTION INTRAMUSCULAR; INTRAVENOUS; SUBCUTANEOUS ONCE
Refills: 0 | Status: COMPLETED | OUTPATIENT
Start: 2021-06-27 | End: 2021-06-27

## 2021-06-27 RX ORDER — MECLIZINE HCL 12.5 MG
25 TABLET ORAL ONCE
Refills: 0 | Status: COMPLETED | OUTPATIENT
Start: 2021-06-27 | End: 2021-06-27

## 2021-06-27 RX ADMIN — Medication 25 MILLIGRAM(S): at 20:17

## 2021-06-27 RX ADMIN — SODIUM CHLORIDE 1000 MILLILITER(S): 9 INJECTION INTRAMUSCULAR; INTRAVENOUS; SUBCUTANEOUS at 20:17

## 2021-06-27 NOTE — ED PROVIDER NOTE - PATIENT PORTAL LINK FT
You can access the FollowMyHealth Patient Portal offered by [facility] by registering at the following website: [email or web address]. By joining [employer]’s FollowMyHealth portal, you will also be able to view your health information using other applications (apps) compatible with our system.

## 2021-06-27 NOTE — ED PROVIDER NOTE - MUSCULOSKELETAL, MLM
Spine appears normal, no C-T-L midline spinal tenderness, range of motion is not limited, no muscle or joint tenderness
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
## 2021-06-27 NOTE — ED PROVIDER NOTE - NSFOLLOWUPINSTRUCTIONS_ED_ALL_ED_FT
Please follow up with your primary care doctor in 3-4 days. Return to the ER if you develop any concerning symptoms.     Vertigo    WHAT YOU NEED TO KNOW:    Vertigo is a condition that causes you to feel dizzy. You may feel that you or everything around you is moving or spinning. You may also feel like you are being pulled down or toward your side.     DISCHARGE INSTRUCTIONS:    Return to the emergency department if:   •You have a headache and a stiff neck.      •You have shaking chills and a fever.       •You vomit over and over with no relief.       •You have blood, pus, or fluid coming out of your ears.      •You are confused.       Contact your healthcare provider if:   •Your symptoms do not get better with treatment.       •You have questions about your condition or care.      Medicines:   •Medicine may be given to help relieve your symptoms.      •Take your medicine as directed. Contact your healthcare provider if you think your medicine is not helping or if you have side effects. Tell him or her if you are allergic to any medicine. Keep a list of the medicines, vitamins, and herbs you take. Include the amounts, and when and why you take them. Bring the list or the pill bottles to follow-up visits. Carry your medicine list with you in case of an emergency.      Manage your symptoms:   •Do not drive, walk without help, or operate heavy machinery when you are dizzy.       •Move slowly when you move from one position to another position. Get up slowly from sitting or lying down. Sit or lie down right away if you feel dizzy.      •Drink plenty of liquids. Liquids help prevent dehydration. Ask how much liquid to drink each day and which liquids are best for you.      •Vestibular and balance rehabilitation therapy (VBRT) is used to teach you exercises to improve your balance and strength. These exercises may help decrease your vertigo and improve your balance. Ask for more information about this therapy.      Follow up with your healthcare provider as directed: Write down your questions so you remember to ask them during your visits.        © Copyright [employer] 2021         Syncope    Syncope is when you temporarily lose consciousness, also called fainting or passing out. It is caused by a sudden decrease in blood flow to the brain. Even though most causes of syncope are not dangerous, syncope can possibly be a sign of a serious medical problem. Signs that you may be about to faint include feeling dizzy, lightheaded, nausea, visual changes, or cold/clammy skin. Do not drive, operate heavy machinery, or play sports until your health care provider says it is okay.    SEEK IMMEDIATE MEDICAL CARE IF YOU HAVE ANY OF THE FOLLOWING SYMPTOMS: severe headache, pain in your chest/abdomen/back, bleeding from your mouth or rectum, palpitations, shortness of breath, pain with breathing, seizure, confusion, or trouble walking.    Alcohol Abuse    Alcohol intoxication occurs when the amount of alcohol that a person has consumed impairs his or her ability to mentally and physically function. Chronic alcohol consumption can also lead to a variety of health issues including neurological disease, stomach disease, heart disease, liver disease, etc. Do not drive after drinking alcohol. Drinking enough alcohol to end up in an Emergency Room suggests you may have an alcohol abuse problem. Seek help at a drug addiction center.    SEEK IMMEDIATE MEDICAL CARE IF YOU HAVE ANY OF THE FOLLOWING SYMPTOMS: seizures, vomiting blood, blood in your stool, lightheadedness/dizziness, or becoming shaky to tremulous when you stop drinking.

## 2021-06-27 NOTE — ED ADULT NURSE NOTE - CHIEF COMPLAINT QUOTE
patient BIBA from [place]. s/p syncopal episode. he states that he has been drinking yesterday and today and his vertigo has been acting up. he took his meclizine with no relief. today was still feeling room spinning as he was going down the escalator. fell 2 step forward. denies chest pain, headache, fever, chills. drinks daily.

## 2021-06-27 NOTE — ED ADULT NURSE NOTE - NSIMPLEMENTINTERV_GEN_ALL_ED
Implemented All Fall Risk Interventions:  [place] to call system. Call bell, personal items and telephone within reach. Instruct patient to call for assistance. Room bathroom lighting operational. Non-slip footwear when patient is off stretcher. Physically safe environment: no spills, clutter or unnecessary equipment. Stretcher in lowest position, wheels locked, appropriate side rails in place. Provide visual cue, wrist band, yellow gown, etc. Monitor gait and stability. Monitor for mental status changes and reorient to person, place, and time. Review medications for side effects contributing to fall risk. Reinforce activity limits and safety measures with patient and family.

## 2021-06-27 NOTE — ED PROVIDER NOTE - PMH
Alcohol abuse    Anxiety    Heart murmur    Traumatic hemorrhage of cerebrum with loss of consciousness of 1 hour to 5 hours 59 minutes, unspecified laterality, sequela    Vertigo

## 2021-06-27 NOTE — ED ADULT NURSE NOTE - OBJECTIVE STATEMENT
patient alert and oriented x 3 came BIBA c/o syncopal episode after falling from escalator about 8 steps , pt. reported been drinking vodka yesterday and 3 beers today , was at escalator started room spinning and fell . History of vertigo on meclizine , but unable to take his meds today bec he is drinking ,. Pt. also c/o headache. Denies any nausea , vomiting , fever , chills , chest pain nor sob ., Awaiting to be seen by Dr. Hinkle , all labs sent , safety maintained . Pt. reported had covid vaccine . patient alert and oriented x 3 came BIBA c/o syncopal episode after falling from escalator about 8 steps , pt. reported been drinking vodka yesterday and 3 beers today , was at escalator started room spinning and fell . History of vertigo on meclizine , but unable to take his meds today bec he is drinking ,. Pt. also c/o headache. Denies any nausea , vomiting , fever , chills , chest pain nor sob ., Awaiting to be seen by Dr. Hinkle , all labs sent , safety maintained . Pt. reported had covid vaccine .. Not on thinner .

## 2021-06-27 NOTE — ED PROVIDER NOTE - NSFOLLOWUPCLINICS_GEN_ALL_ED_FT
[facility]  Family Medicine  [address]  [place], [place] [address]  Phone: [phone]  Fax:   Follow Up Time: 4-6 Days

## 2021-06-27 NOTE — ED ADULT TRIAGE NOTE - CHIEF COMPLAINT QUOTE
patient BIBA from [place]. s/p syncopal episode. he states that he has been drinking yesterday and today and his vertigo has been acting up. he took his meclizine with no relief. today was still feeling room spinning as he was going down the escalator. fell 2 step forward. denies chest pain, headache, fever, chills. drinks daily.
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
## 2021-06-27 NOTE — ED ADULT NURSE NOTE - CHPI ED NUR SYMPTOMS NEG
no blurred vision/no change in level of consciousness/no confusion/no fever/no nausea/no numbness/no vomiting/no weakness

## 2021-06-27 NOTE — ED PROVIDER NOTE - CONSTITUTIONAL, MLM
Hospitalist Progress Note        Assessment and Plan  Principal Problem:    Aspiration pneumonia of left lower lobe due to gastric secretions (H)  Active Problems:    Hypokalemia    Former smoker - quit in 2014    Anxiety    Hypothyroidism    Metabolic syndrome    Aspiration pneumonia due to inhalation of vomitus (H)    Diarrhea of presumed infectious origin    Hypotension, unspecified hypotension type    Acute respiratory failure with hypoxia (H)    Cough    Chest pain at rest    Deep vein thrombosis (DVT) of upper extremity (H)    Pulmonary embolism (H)    Acute respiratory distress syndrome (ARDS) (H)    Diffuse interstitial pulmonary disease (H)    Acute kidney failure, unspecified (H)      Acute respiratory failure  -  Intubated and undergoing mechanical ventilation  -  Bronchodilator therapy  -  IV methylprednisolone  -  Paralytic was discontinued    Anxiety and depression  -  Continue bupropion  -  Continue citalopram  -  Supportive care    Hypothyroidism  -  Continue levothyroxine    Primary hypertension  -  BP elevated  -  Continue metoprolol tartrate  -  Monitoring BP      VTE risk reduction.  [facility]              Brief Summary  53 y/o female with hx of MDD, anxiety, RLS, former smoker, migraines, presented here from Urgent Care for concern aspiration pneumonia that had failed outpatient treatment. She actually had a colonoscopy on 2/25/20 and had an aspiration event then, was then placed on clinda and continue to get worse and was admitted here to the floor. Unfortunately, she continued to decline in her resp status, required moving to ICU and was intubated on 3/11/20. She was also found to have arm DVT, bilateral Pe's and is on hep gtt too. She remains in the ICU vented and sedated still        Summary of Care     Prior to hospital     2/25/20--colonoscopy with aspiration event, treated as outpt with clinda        [facility]     3/1/20--Urgent care --for resp issues, sent to [facility] for admit.  Placed on iv ceftriaxone +flagyl  3/2/20--wbc 11  3/3/20--3/5/20--on floor , antibiotics  3/6/20--increasing need o2, up to 6 liters, wbc 32,now on Iv Cefepime, Pulm consulted  3/7/20--Picc placed, EF on echo 63%, transferred to ICU for bipap  3/8/20--stopped levofloxacin  3/9/20--on high flow O2  3/10/20--on high flow O2, DVT right arm, picc removed, hep gtt started  3/11/20--ARDS, broch and intubated, WBC 14, pressors. CTA showed bilateral Pe's  3/12/20--still vented,high dose iv steroids, Veletri, OG for TF  3/13/20--still vented terry dose steroids, increased Peep, dc precedex  3/14/20--still on low dose pressors-weaning, still vented, FiO2 50%, high dose steroids, TF residuals, now trickle feeds, creat up 1.33, wbc 27  3/15/20--still vented, bronch done today--diffuse airway inflammation, friable-BAL, increased Jerel, still iv steroids. Dc Cefepime iv, new left IJ placed  3/16/20--still vented, Fio2 50%, ABG this am improved with PH 7.3,pCO2 59, wbc 20      Subjective and Interim Events  Intubated, undergoing mechanical ventilation    Physical Examination    Vital signs in last 24 hours  Temp:  [98  F (36.7  C)-99.5  F (37.5  C)] 99.4  F (37.4  C)  Heart Rate:  [] 75  Resp:  [31-62] 34  BP: (156)/(74) 156/74  Arterial Line BP: (107-168)/(48-81) 124/54  FiO2 (%):  [40 %-50 %] 45 % 92% O2 Device: Ventilator O2 Flow Rate (L/min): 45 L/min  Weight:   140 lb 11.2 oz (63.8 kg) Weight change: -2 lb 2.5 oz (-0.979 kg)    General: Intubated, undergoing mechanical ventilation  HEENT: Sclera white.  No redness or jaundice.   Cardiac: RRR, no abnormal heart sounds   Pulmonary: Intubated, undergoing mechanical ventilation  Abdomen: Nondistended.  Nontender to palpation.   Musculoskeletal: No joint abnormalities noted  Skin: Normally turgid   Neurologic: Lethargic  Psychiatric: Calm      Intake/Output last 3 shifts  I/O last 3 completed shifts:  In: 2289.2 [I.V.:1121.2; NG/GT:1168]  Out: 1670 [Urine:1670]  Body mass  index is 29.41 kg/m .        Pertinent Labs   Lab Results: personally reviewed.   Results from last 7 days   Lab Units 03/18/20 0439 03/17/20 0358 03/16/20  0605 03/13/20  0500   LN-SODIUM mmol/L 145 143 141   < > 141   LN-POTASSIUM mmol/L 5.0 4.8 4.7   < > 4.8   LN-CHLORIDE mmol/L 102 102 103   < > 106   LN-CO2 mmol/L 35* 35* 31   < > 29   LN-BLOOD UREA NITROGEN mg/dL 60* 62* 59*   < > 21   LN-CREATININE mg/dL 0.78 0.95 1.20*   < > 0.97   LN-CALCIUM mg/dL 8.9 8.8 8.5   < > 8.7   LN-ALBUMIN g/dL  --   --   --   --  2.0*   LN-PROTEIN TOTAL g/dL  --   --   --   --  6.2   LN-BILIRUBIN TOTAL mg/dL  --   --   --   --  0.2   LN-ALKALINE PHOSPHATASE U/L  --   --   --   --  119   LN-ALT (SGPT) U/L  --   --   --   --  15   LN-AST (SGOT) U/L  --   --   --   --  13   LN-MAGNESIUM mg/dL 2.5 2.7*  --   --   --     < > = values in this interval not displayed.     Results from last 7 days   Lab Units 03/18/20 0439 03/18/20 0439 03/17/20 0359 03/16/20  0605 03/15/20  0623 03/14/20  0511   LN-WHITE BLOOD CELL COUNT thou/uL  --   --  29.4* 20.2* 25.1* 27.0*   LN-HEMOGLOBIN g/dL 8.8*  --  9.1* 8.3* 9.1* 9.0*   LN-HEMATOCRIT %  --   --  29.1* 26.8* 29.9* 29.1*   LN-PLATELET COUNT thou/uL  --  193 241 165 199 190   LN-NEUTROPHILS RELATIVE PERCENT %  --   --   --   --   --  87*   LN-MONOCYTES RELATIVE PERCENT %  --   --   --   --   --  3     Results from last 7 days   Lab Units 03/13/20  0500   LN-CREATINE KINASE TOTAL U/L 31     Results from last 7 days   Lab Units 03/18/20  1832 03/18/20  1734 03/18/20  1448 03/18/20  1303 03/18/20  1110 03/18/20  1000 03/18/20  0906 03/18/20  0753 03/18/20  0704 03/18/20  0603   LN-POC GLUCOSE FINGERSTICK- HE mg/dL 147* 140* 108 109 116 112 114 113 136 168*         Pertinent Radiology   Radiology Results: Personally reviewed impression/s     EXAM: XR CHEST 1 VIEW PORTABLE  LOCATION:   DATE/TIME: 3/15/2020 12:37 PM     INDICATION: s/p left IJ TLC placement confirm location, r/o  PTX  COMPARISON: 03/15/2020 at 0602 hours     IMPRESSION:   New left IJ central venous catheter tip is in the right atrium. No pneumothorax. NG tube courses below the diaphragm. Endotracheal tube remains above the johnnie. Bilateral pulmonary infiltrates have not appreciably changed.          EKG Results: Personally reviewed ECG tracing.  3/10/20 ECG demonstrated NSR.                  Total visit time 40 minutes; more than 50% of time spent counseling and coordinating patient care.  25 minutes of face-to-face care.  Care included review of treatment, prognosis and today's changes of the treatment plan.                                        Denzel Avina MD, MS  Internal Medicine Hospitalist  3/18/2020         normal... Well appearing, awake, alert, oriented to person, place, time/situation and in no apparent distress. Well appearing, awake, alert, oriented and in no apparent distress.

## 2021-06-28 PROBLEM — S06.363S: Chronic | Status: ACTIVE | Noted: 2018-09-14

## 2021-06-28 PROBLEM — F41.9 ANXIETY DISORDER, UNSPECIFIED: Chronic | Status: ACTIVE | Noted: 2018-09-14

## 2021-06-28 PROBLEM — R42 DIZZINESS AND GIDDINESS: Chronic | Status: ACTIVE | Noted: 2018-09-14
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
## 2022-05-04 NOTE — ED ADULT NURSE NOTE - CAS ELECT INFOMATION PROVIDED
Refill request for OCP    Last refilled:   Disp Refills Start End    Levonorgest-Eth Estrad 91-Day (AMETHIA LO) 0.1-0.02 & 0.01 MG Tab 91 tablet 5 5/2/2022     Sig - Route: Take 1 tablet by mouth daily. - Oral        Patient has current refills at pharmacy   DC instructions

## 2022-05-29 ENCOUNTER — EMERGENCY (EMERGENCY)
Facility: HOSPITAL | Age: 57
LOS: 1 days | Discharge: HOME | End: 2022-05-29
Admitting: EMERGENCY MEDICINE
Payer: MEDICAID

## 2022-05-29 VITALS
RESPIRATION RATE: 20 BRPM | TEMPERATURE: 97 F | HEIGHT: 69 IN | HEART RATE: 70 BPM | DIASTOLIC BLOOD PRESSURE: 62 MMHG | SYSTOLIC BLOOD PRESSURE: 100 MMHG | OXYGEN SATURATION: 96 % | WEIGHT: 149.91 LBS
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
## 2022-05-29 PROCEDURE — 99284 EMERGENCY DEPT VISIT MOD MDM: CPT
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
## 2022-05-29 NOTE — ED ADULT NURSE NOTE - NSIMPLEMENTINTERV_GEN_ALL_ED
Implemented All Fall Risk Interventions:  [place] to call system. Call bell, personal items and telephone within reach. Instruct patient to call for assistance. Room bathroom lighting operational. Non-slip footwear when patient is off stretcher. Physically safe environment: no spills, clutter or unnecessary equipment. Stretcher in lowest position, wheels locked, appropriate side rails in place. Provide visual cue, wrist band, yellow gown, etc. Monitor gait and stability. Monitor for mental status changes and reorient to person, place, and time. Review medications for side effects contributing to fall risk. Reinforce activity limits and safety measures with patient and family.
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
## 2022-05-30 VITALS
OXYGEN SATURATION: 96 % | DIASTOLIC BLOOD PRESSURE: 74 MMHG | HEART RATE: 66 BPM | SYSTOLIC BLOOD PRESSURE: 108 MMHG | TEMPERATURE: 98 F | RESPIRATION RATE: 16 BRPM

## 2022-05-30 PROBLEM — F10.10 ALCOHOL ABUSE, UNCOMPLICATED: Chronic | Status: ACTIVE | Noted: 2021-06-30

## 2022-05-30 PROBLEM — R01.1 CARDIAC MURMUR, UNSPECIFIED: Chronic | Status: ACTIVE | Noted: 2021-06-30

## 2022-05-30 RX ORDER — MECLIZINE HCL 12.5 MG
1 TABLET ORAL
Qty: 90 | Refills: 0
Start: 2022-05-30 | End: 2022-06-28

## 2022-05-30 RX ORDER — MECLIZINE HCL 12.5 MG
25 TABLET ORAL ONCE
Refills: 0 | Status: COMPLETED | OUTPATIENT
Start: 2022-05-30 | End: 2022-05-30

## 2022-05-30 RX ADMIN — Medication 25 MILLIGRAM(S): at 05:55

## 2022-05-30 NOTE — ED PROVIDER NOTE - NSFOLLOWUPCLINICS_GEN_ALL_ED_FT
[facility]  Family Medicine  [address]  [place], [place] [address]  Phone: [phone]  Fax:

## 2022-05-30 NOTE — ED ADULT NURSE REASSESSMENT NOTE - NS ED NURSE REASSESS COMMENT FT1
Transfer of care received. Pt asleep w/ bed alarms active and audible. Awaiting MTF.   Will continue to monitor.
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
## 2022-05-30 NOTE — ED PROVIDER NOTE - PATIENT PORTAL LINK FT
You can access the FollowMyHealth Patient Portal offered by [facility] by registering at the following website: [email or web address]. By joining [employer]’s FollowMyHealth portal, you will also be able to view your health information using other applications (apps) compatible with our system.

## 2022-05-31 DIAGNOSIS — Z88.1 ALLERGY STATUS TO OTHER ANTIBIOTIC AGENTS STATUS: ICD-10-CM

## 2022-05-31 DIAGNOSIS — F10.920 ALCOHOL USE, UNSPECIFIED WITH INTOXICATION, UNCOMPLICATED: ICD-10-CM

## 2022-11-06 ENCOUNTER — EMERGENCY (EMERGENCY)
Facility: HOSPITAL | Age: 57
LOS: 1 days | Discharge: HOME | End: 2022-11-06
Attending: EMERGENCY MEDICINE | Admitting: EMERGENCY MEDICINE

## 2022-11-06 VITALS
DIASTOLIC BLOOD PRESSURE: 91 MMHG | OXYGEN SATURATION: 96 % | RESPIRATION RATE: 16 BRPM | SYSTOLIC BLOOD PRESSURE: 145 MMHG | WEIGHT: 145.06 LBS | HEART RATE: 70 BPM | TEMPERATURE: 98 F | HEIGHT: 69 IN
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
## 2022-11-06 PROCEDURE — 99284 EMERGENCY DEPT VISIT MOD MDM: CPT

## 2022-11-06 NOTE — ED ADULT TRIAGE NOTE - CHIEF COMPLAINT QUOTE
BIBEMS for dizziness, pmh of vertigo. Reports not taking his vertigo medications. Walking with steady gait.

## 2022-11-07 VITALS
TEMPERATURE: 98 F | DIASTOLIC BLOOD PRESSURE: 75 MMHG | OXYGEN SATURATION: 96 % | RESPIRATION RATE: 17 BRPM | HEART RATE: 63 BPM | SYSTOLIC BLOOD PRESSURE: 112 MMHG

## 2022-11-07 RX ORDER — MECLIZINE HCL 12.5 MG
25 TABLET ORAL ONCE
Refills: 0 | Status: COMPLETED | OUTPATIENT
Start: 2022-11-07 | End: 2022-11-07

## 2022-11-07 RX ORDER — MECLIZINE HCL 12.5 MG
1 TABLET ORAL
Qty: 30 | Refills: 0
Start: 2022-11-07 | End: 2022-11-16

## 2022-11-07 RX ADMIN — Medication 25 MILLIGRAM(S): at 01:38

## 2022-11-07 NOTE — ED PROVIDER NOTE - PATIENT PORTAL LINK FT
You can access the FollowMyHealth Patient Portal offered by [facility] by registering at the following website: [email or web address]. By joining [employer]’s FollowMyHealth portal, you will also be able to view your health information using other applications (apps) compatible with our system.

## 2022-11-07 NOTE — ED ADULT NURSE NOTE - OBJECTIVE STATEMENT
57y male c/o dizziness. Pt has a pmh of vertigo. Has not taken his vertigo medication recently. Ambulates w/ steady gait. Sitting in chair comfortably.

## 2022-11-07 NOTE — ED ADULT NURSE NOTE - NSSUHOSCREENINGYN_ED_ALL_ED
TV IE no abscess noted  antibiotics plan as above No - the patient is unable to be screened due to medical condition

## 2022-11-07 NOTE — ED PROVIDER NOTE - NSFOLLOWUPINSTRUCTIONS_ED_ALL_ED_FT
Benign Paroxysmal Positional Vertigo    WHAT YOU NEED TO KNOW:    BPPV is an inner ear condition that causes you to suddenly feel dizzy. Benign means it is not serious or life-threatening. BPPV is caused by a problem with the nerves and structure of your inner ear. BPPV happens when small pieces of calcium break loose and lump together in one of your inner ear canals.   Ear Anatomy         DISCHARGE INSTRUCTIONS:    Return to the emergency department if:   •You fall during a BPPV episode and are injured.      •You have a severe headache that does not go away.      •You have new changes in your vision or feel weak or confused.      •You have problems hearing, or you have ringing or buzzing in your ears.      Contact your healthcare provider if:   •Your BPPV symptoms do not go away or they return.      •You have problems with your balance, or you are falling often.      •You have new or increased nausea or vomiting with vertigo.      •You feel anxious or depressed and do not want to leave your home.      •You have questions or concerns about your condition or care.      Medicines:   •Medicines may be recommended or prescribed to treat dizziness or nausea.      •Take your medicine as directed. Contact your healthcare provider if you think your medicine is not helping or if you have side effects. Tell your provider if you are allergic to any medicine. Keep a list of the medicines, vitamins, and herbs you take. Include the amounts, and when and why you take them. Bring the list or the pill bottles to follow-up visits. Carry your medicine list with you in case of an emergency.      Prevent your symptoms:   •Try to avoid sudden head movements. Stand up and lie down slowly.       •Raise and support your head when you lie down. Place pillows under your upper back and head or rest in a recliner.       •Change your position often when you are lying down. Try not to lie with your head on the same side for long periods of time. Roll over slowly.       •Wear protective gear when you ride a bike or play sports. A helmet helps protect your head from injury.      Follow up with your healthcare provider as directed: You may need to return in 1 month to check the progress of your treatment. Write down your questions so you remember to ask them during your visits.

## 2022-11-07 NOTE — ED PROVIDER NOTE - CLINICAL SUMMARY MEDICAL DECISION MAKING FREE TEXT BOX
Exacerbation of chronic vertigo, given dose of meclizine and prescribed short course for home. d/c home with return precautions.
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
## 2022-11-10 DIAGNOSIS — Z91.14 PATIENT'S OTHER NONCOMPLIANCE WITH MEDICATION REGIMEN: ICD-10-CM

## 2022-11-10 DIAGNOSIS — R42 DIZZINESS AND GIDDINESS: ICD-10-CM
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
## 2022-11-10 DIAGNOSIS — Z88.1 ALLERGY STATUS TO OTHER ANTIBIOTIC AGENTS STATUS: ICD-10-CM

## 2022-11-10 DIAGNOSIS — H81.399 OTHER PERIPHERAL VERTIGO, UNSPECIFIED EAR: ICD-10-CM

## 2022-11-10 DIAGNOSIS — F17.200 NICOTINE DEPENDENCE, UNSPECIFIED, UNCOMPLICATED: ICD-10-CM

## 2022-11-10 DIAGNOSIS — T45.0X6A UNDERDOSING OF ANTIALLERGIC AND ANTIEMETIC DRUGS, INITIAL ENCOUNTER: ICD-10-CM
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
## 2023-11-21 NOTE — ED PROVIDER NOTE - PHYSICAL EXAMINATION
Discussed importance of this. Especially since he is experiencing some fatigue.    R foot: 2cm wound overlying 1st MTP- draining purulent discharge. exquisitely tender to palpation. entire MTP is erythematous, edematous, tender and warm. (-) crepitus difficulty flexing/extending toe 2/2 pain. Extension worse than flexion.  pain and tenderness with palpation of both extensor and flexor tendons . streaking [facility] up lower leg. +2 pedal pulses. sensation intact. <2 sec capillary refill    VITAL SIGNS: I have reviewed nursing notes and confirm.  CONSTITUTIONAL: Well-developed; well-nourished; in no acute distress.  SKIN: Skin is warm and dry, no acute rash.  HEAD: Normocephalic; atraumatic.  EYES: PERRL, EOM intact; conjunctiva and sclera clear.  ENT: No nasal discharge; airway clear.  NECK: Supple; non tender.  CARD: S1, S2 normal; no murmurs, gallops, or rubs. Regular rate and rhythm.  RESP: No wheezes, rales or rhonchi.  ABD: Normal bowel sounds; soft; non-distended; non-tender; no hepatosplenomegaly.  ALL OTHER EXT: Normal ROM. No clubbing, cyanosis or edema.  NEURO: Alert, oriented. Grossly unremarkable.  PSYCH: Cooperative, appropriate.

## 2024-05-29 NOTE — ED PROVIDER NOTE - CROS ED CARDIOVAS ALL NEG
edema  Neurologic: no cranial nerve deficit and speech normal        LABS: Reviewed by me independently  Recent Labs     05/28/24 2051      K 3.9      CO2 25   BUN 13   CREATININE 0.7   GLUCOSE 116*   CALCIUM 9.8       Recent Labs     05/28/24 2051   WBC 7.6   RBC 4.03   HGB 11.9   HCT 38.3   MCV 95.0   MCH 29.5   MCHC 31.1*   RDW 15.5*   *   MPV 11.2       Recent Labs     05/29/24  0159   POCGLU 76           Radiology: Reviewed by me independently  CT ABDOMEN PELVIS W IV CONTRAST Additional Contrast? None   Final Result   1. Mild gastritis.   2. Hepatic cirrhosis with splenomegaly and gastroesophageal varicosities.   3. Trace abdominopelvic ascites.   4. Nonobstructing bilateral nephrolithiasis.   5. Mild sigmoid diverticulosis.             EKG:     ASSESSMENT:      Principal Problem:    Melena  Resolved Problems:    * No resolved hospital problems. *      PLAN:    1.  GERD-Protonix IV, n.p.o. after midnight, GI consult, repeat labs in morning and follow.  2.  Melena-n.p.o. after midnight, GI consult, possibly because of 1, Protonix\, will follow.  3.  Tachycardia- sinus tachycardia, will continue IV hydration and follow.  Signs of sepsis.  4.  Primary hypertension-continue home medications.  No acute issue.  5.  Hyperlipidemia-stable, no acute issue.  6.  Hypothyroidism-continue Synthroid.  No acute issue.  7.  Diabetes mellitus type 2-insulin sliding scale, diabetic diet, hypoglycemic protocol.    Code Status: Full code  DVT prophylaxis: Bilateral SCDs  PUD prophylaxis-Protonix      45 minutes or more were spent in assessing patient, reviewing charts, discussing plan of care and documentation.      NOTE: This report was transcribed using voice recognition software. Every effort was made to ensure accuracy; however, inadvertent computerized transcription errors may be present.  Electronically signed by Yasir Villalobos MD on 5/29/2024 at 2:53 AM  
negative...

## 2024-06-21 NOTE — ED ADULT NURSE NOTE - CAS EDP DISCH TYPE
Include Z78.9 (Other Specified Conditions Influencing Health Status) As An Associated Diagnosis?: No
Consent: The patient's consent was obtained including but not limited to risks of crusting, scabbing, scarring, blistering, darker or lighter pigmentary change, recurrence, incomplete removal and infection.
Cantharone Duration Text (Please Remove Duration From Postcare): The patient was instructed to leave the Cantharone on for 6-8 hours and then wash the area well with soap and water.
Medical Necessity Clause: This procedure was medically necessary because the lesions that were treated were:
Cantharone Forte Duration Text (Please Remove Duration From Postcare): The patient was instructed to leave the Cantharone Forte on for 6-8 hours and then wash the area well with soap and water.
Canthacur Ps Duration Text (Please Remove Duration From Postcare): The patient was instructed to leave the Canthacur PS on for 6-8 hours and then wash the area well with soap and water.
Curette Text: Prior to application of cantharidin the lesions were lightly pared with a curette.
Medical Necessity Information: It is in your best interest to select a reason for this procedure from the list below. All of these items fulfill various CMS LCD requirements except the new and changing color options.
Cantharone Plus Duration Text (Please Remove Duration From Postcare): The patient was instructed to leave the Cantharone Plus on for 6-8 hours and then wash the area well with soap and water.
Post-Care Instructions: I reviewed with the patient in detail post-care instructions. The patient understands that the treated areas should be washed off 6 to 8 hours after application.
Strength: Laura
Canthacur Duration Text (Please Remove Duration From Postcare): The patient was instructed to leave the Canthacur on for 6-8 hours and then wash the area well with soap and water.
Detail Level: Detailed
Home